# Patient Record
Sex: FEMALE | Race: WHITE
[De-identification: names, ages, dates, MRNs, and addresses within clinical notes are randomized per-mention and may not be internally consistent; named-entity substitution may affect disease eponyms.]

---

## 2020-12-28 ENCOUNTER — HOSPITAL ENCOUNTER (OUTPATIENT)
Age: 60
End: 2020-12-28
Payer: OTHER GOVERNMENT

## 2020-12-28 DIAGNOSIS — Z12.31: Primary | ICD-10-CM

## 2020-12-28 PROCEDURE — 77067 SCR MAMMO BI INCL CAD: CPT

## 2020-12-28 PROCEDURE — 77063 BREAST TOMOSYNTHESIS BI: CPT

## 2020-12-28 NOTE — DI.MG.S_ITS
BILATERAL DIGITAL SCREENING MAMMOGRAM 3D/2D WITH CAD: 12/28/2020  
   
CLINICAL: Routine screening.    
   
Comparison is made to exams dated:  11/20/2015 mammogram and 4/24/2017 mammogram -   
outside location.  There are scattered fibroglandular elements in both breasts.    
   
Current study was also evaluated with a Computer Aided Detection (CAD) system.    
No significant masses, calcifications, or other findings are seen in either breast.    
There has been no significant interval change.  
   
IMPRESSION: NEGATIVE  
There is no mammographic evidence of malignancy. A 1 year screening mammogram is   
recommended.    
   
   
This exam was interpreted at Station ID: [id number].    
   
NOTE: For mammograms, a report in lay terms will be sent to the patient. Approximately   
15% of breast malignancies will not be visualized mammographically. In the management of   
a palpable breast mass, a negative mammogram must not discourage biopsy of a clinically   
suspicious lesion.  
   
Electronically Signed By: Bryce [email or web address]:12/28/2020 17:23:36    
   
   
letter sent: Normal Exam    
ACR BI-RADS Category 1: Negative [id number]

## 2022-03-18 ENCOUNTER — HOSPITAL ENCOUNTER (OUTPATIENT)
Age: 62
End: 2022-03-18
Payer: OTHER GOVERNMENT

## 2022-03-18 DIAGNOSIS — Z12.31: Primary | ICD-10-CM

## 2022-03-18 PROCEDURE — 77063 BREAST TOMOSYNTHESIS BI: CPT

## 2022-03-18 PROCEDURE — 77067 SCR MAMMO BI INCL CAD: CPT

## 2022-04-14 ENCOUNTER — HOSPITAL ENCOUNTER (EMERGENCY)
Age: 62
Discharge: HOME | End: 2022-04-14
Payer: OTHER GOVERNMENT

## 2022-04-14 VITALS
HEART RATE: 75 BPM | TEMPERATURE: 97.8 F | OXYGEN SATURATION: 100 % | SYSTOLIC BLOOD PRESSURE: 125 MMHG | DIASTOLIC BLOOD PRESSURE: 83 MMHG | RESPIRATION RATE: 18 BRPM

## 2022-04-14 VITALS
RESPIRATION RATE: 16 BRPM | SYSTOLIC BLOOD PRESSURE: 118 MMHG | HEART RATE: 77 BPM | OXYGEN SATURATION: 100 % | DIASTOLIC BLOOD PRESSURE: 82 MMHG

## 2022-04-14 VITALS — BODY MASS INDEX: 24.7 KG/M2

## 2022-04-14 DIAGNOSIS — B96.4: ICD-10-CM

## 2022-04-14 DIAGNOSIS — R11.2: Primary | ICD-10-CM

## 2022-04-14 DIAGNOSIS — R19.7: ICD-10-CM

## 2022-04-14 LAB
ADD MANUAL DIFF / SLIDE REVIEW: NO
ALBUMIN SERPL-MCNC: 4.7 G/DL (ref 3.5–5)
ALBUMIN/GLOB SERPL: 1.3 {RATIO} (ref 1–2.8)
ALP SERPL-CCNC: 226 U/L (ref 38–126)
ALT SERPL-CCNC: 306 IU/L (ref ?–35)
APPEARANCE UR: (no result)
BILIRUBIN URINE UA: (no result)
BUN SERPL-MCNC: 15 MG/DL (ref 7–17)
CALCIUM SERPL-MCNC: 10.1 MG/DL (ref 8.4–10.2)
CHLORIDE SERPL-SCNC: 108 MMOL/L (ref 98–107)
CO2 SERPL-SCNC: 21 MMOL/L (ref 22–32)
COLOR UR: YELLOW
CULTURE INDICATED URINE: (no result)
ESTIMATED GLOMERULAR FILT RATE: > 60 ML/MIN (ref 60–?)
GLOBULIN SER CALC-MCNC: 3.7 G/DL (ref 1.7–4.1)
GLUCOSE SERPL-MCNC: 116 MG/DL (ref 70–100)
GLUCOSE URINE UA: NEGATIVE G/DL
HEMATOCRIT: 43.7 % (ref 36–46)
HEMOGLOBIN: 14.9 G/DL (ref 12–16)
HEMOLYSIS: 68 (ref 0–50)
HGB UR QL: (no result)
KETONES URINE UA: (no result)
LEUKOCYTE ESTERASE URINE UA: (no result)
LIPASE SERPL-CCNC: 101 U/L (ref 23–300)
LYMPHOCYTES # SPEC AUTO: 1300 /UL (ref 1100–4500)
MCV RBC: 89.7 FL (ref 80–100)
MEAN CORPUSCULAR HEMOGLOBIN: 30.5 PG (ref 26–34)
MEAN CORPUSCULAR HGB CONC: 34 % (ref 30–36)
NITRITE URINE UA: NEGATIVE
PH UR: 6 [PH] (ref 4.5–8)
PLATELET COUNT: 186 X10^3/UL (ref 150–400)
POTASSIUM SERPL-SCNC: 3.8 MMOL/L (ref 3.4–5.1)
PROT SERPL-MCNC: 8.4 G/DL (ref 6.3–8.2)
PROTEIN URINE UA: (no result)
SODIUM SERPL-SCNC: 139 MMOL/L (ref 137–145)
SP GR UR: 1.02 (ref 1–1.03)
UROBILINOGEN UR QL: 0.2 E.U./DL

## 2022-04-14 PROCEDURE — 87077 CULTURE AEROBIC IDENTIFY: CPT

## 2022-04-14 PROCEDURE — 81001 URINALYSIS AUTO W/SCOPE: CPT

## 2022-04-14 PROCEDURE — 93005 ELECTROCARDIOGRAM TRACING: CPT

## 2022-04-14 PROCEDURE — 87507 IADNA-DNA/RNA PROBE TQ 12-25: CPT

## 2022-04-14 PROCEDURE — 36415 COLL VENOUS BLD VENIPUNCTURE: CPT

## 2022-04-14 PROCEDURE — 74177 CT ABD & PELVIS W/CONTRAST: CPT

## 2022-04-14 PROCEDURE — 87186 SC STD MICRODIL/AGAR DIL: CPT

## 2022-04-14 PROCEDURE — 96361 HYDRATE IV INFUSION ADD-ON: CPT

## 2022-04-14 PROCEDURE — 83690 ASSAY OF LIPASE: CPT

## 2022-04-14 PROCEDURE — 87086 URINE CULTURE/COLONY COUNT: CPT

## 2022-04-14 PROCEDURE — 96360 HYDRATION IV INFUSION INIT: CPT

## 2022-04-14 PROCEDURE — 93010 ELECTROCARDIOGRAM REPORT: CPT

## 2022-04-14 PROCEDURE — 80053 COMPREHEN METABOLIC PANEL: CPT

## 2022-04-14 PROCEDURE — 85025 COMPLETE CBC W/AUTO DIFF WBC: CPT

## 2022-04-14 PROCEDURE — 99284 EMERGENCY DEPT VISIT MOD MDM: CPT

## 2023-10-13 ENCOUNTER — HOSPITAL ENCOUNTER (EMERGENCY)
Age: 63
Discharge: HOME | End: 2023-10-13
Payer: OTHER GOVERNMENT

## 2023-10-13 VITALS — OXYGEN SATURATION: 97 % | HEART RATE: 71 BPM

## 2023-10-13 VITALS — DIASTOLIC BLOOD PRESSURE: 89 MMHG | SYSTOLIC BLOOD PRESSURE: 174 MMHG | OXYGEN SATURATION: 100 % | HEART RATE: 75 BPM

## 2023-10-13 VITALS — OXYGEN SATURATION: 100 % | SYSTOLIC BLOOD PRESSURE: 145 MMHG | DIASTOLIC BLOOD PRESSURE: 87 MMHG | HEART RATE: 72 BPM

## 2023-10-13 VITALS — SYSTOLIC BLOOD PRESSURE: 126 MMHG | DIASTOLIC BLOOD PRESSURE: 80 MMHG | HEART RATE: 74 BPM | OXYGEN SATURATION: 99 %

## 2023-10-13 VITALS — OXYGEN SATURATION: 100 % | SYSTOLIC BLOOD PRESSURE: 155 MMHG | DIASTOLIC BLOOD PRESSURE: 94 MMHG | HEART RATE: 74 BPM

## 2023-10-13 VITALS
HEART RATE: 72 BPM | DIASTOLIC BLOOD PRESSURE: 87 MMHG | RESPIRATION RATE: 17 BRPM | SYSTOLIC BLOOD PRESSURE: 148 MMHG | OXYGEN SATURATION: 100 % | TEMPERATURE: 97.9 F

## 2023-10-13 VITALS — HEART RATE: 66 BPM | OXYGEN SATURATION: 100 %

## 2023-10-13 VITALS — SYSTOLIC BLOOD PRESSURE: 132 MMHG | DIASTOLIC BLOOD PRESSURE: 86 MMHG

## 2023-10-13 VITALS — BODY MASS INDEX: 24.3 KG/M2

## 2023-10-13 DIAGNOSIS — N39.0: ICD-10-CM

## 2023-10-13 DIAGNOSIS — N20.0: Primary | ICD-10-CM

## 2023-10-13 LAB
ADD MANUAL DIFF / SLIDE REVIEW: NO
ALBUMIN SERPL-MCNC: 4.6 G/DL (ref 3.5–5)
ALBUMIN/GLOB SERPL: 1.4 {RATIO} (ref 1–2.8)
ALP SERPL-CCNC: 116 U/L (ref 38–126)
ALT SERPL-CCNC: 33 IU/L (ref ?–35)
BUN SERPL-MCNC: 14 MG/DL (ref 7–17)
CALCIUM SERPL-MCNC: 10.4 MG/DL (ref 8.4–10.2)
CHLORIDE SERPL-SCNC: 106 MMOL/L (ref 98–107)
CO2 SERPL-SCNC: 28 MMOL/L (ref 22–32)
CULTURE INDICATED URINE: (no result)
ESTIMATED GLOMERULAR FILT RATE: > 60 ML/MIN (ref 60–?)
GLOBULIN SER CALC-MCNC: 3.3 G/DL (ref 1.7–4.1)
GLUCOSE SERPL-MCNC: 107 MG/DL (ref 80–110)
HEMATOCRIT: 40.7 % (ref 36–46)
HEMOGLOBIN: 13.8 G/DL (ref 12–16)
HEMOLYSIS: 19 (ref 0–50)
LIPASE SERPL-CCNC: 159 U/L (ref 23–300)
LYMPHOCYTES # SPEC AUTO: 1100 /UL (ref 1100–4500)
MCV RBC: 89.6 FL (ref 80–100)
MEAN CORPUSCULAR HEMOGLOBIN: 30.3 PG (ref 26–34)
MEAN CORPUSCULAR HGB CONC: 33.8 % (ref 30–36)
PLATELET COUNT: 208 X10^3/UL (ref 150–400)
POTASSIUM SERPL-SCNC: 4.6 MMOL/L (ref 3.4–5.1)
PROT SERPL-MCNC: 7.9 G/DL (ref 6.3–8.2)
SODIUM SERPL-SCNC: 137 MMOL/L (ref 137–145)

## 2023-10-13 PROCEDURE — 81015 MICROSCOPIC EXAM OF URINE: CPT

## 2023-10-13 PROCEDURE — 96375 TX/PRO/DX INJ NEW DRUG ADDON: CPT

## 2023-10-13 PROCEDURE — 87186 SC STD MICRODIL/AGAR DIL: CPT

## 2023-10-13 PROCEDURE — 93005 ELECTROCARDIOGRAM TRACING: CPT

## 2023-10-13 PROCEDURE — 81003 URINALYSIS AUTO W/O SCOPE: CPT

## 2023-10-13 PROCEDURE — 85025 COMPLETE CBC W/AUTO DIFF WBC: CPT

## 2023-10-13 PROCEDURE — 83690 ASSAY OF LIPASE: CPT

## 2023-10-13 PROCEDURE — 99284 EMERGENCY DEPT VISIT MOD MDM: CPT

## 2023-10-13 PROCEDURE — 36415 COLL VENOUS BLD VENIPUNCTURE: CPT

## 2023-10-13 PROCEDURE — 87086 URINE CULTURE/COLONY COUNT: CPT

## 2023-10-13 PROCEDURE — 74177 CT ABD & PELVIS W/CONTRAST: CPT

## 2023-10-13 PROCEDURE — 87077 CULTURE AEROBIC IDENTIFY: CPT

## 2023-10-13 PROCEDURE — 96365 THER/PROPH/DIAG IV INF INIT: CPT

## 2023-10-13 PROCEDURE — 80053 COMPREHEN METABOLIC PANEL: CPT

## 2025-05-04 ENCOUNTER — HOSPITAL ENCOUNTER (EMERGENCY)
Age: 65
Discharge: HOME | End: 2025-05-04
Payer: OTHER GOVERNMENT

## 2025-05-04 VITALS
OXYGEN SATURATION: 99 % | HEART RATE: 64 BPM | DIASTOLIC BLOOD PRESSURE: 86 MMHG | SYSTOLIC BLOOD PRESSURE: 133 MMHG | RESPIRATION RATE: 22 BRPM

## 2025-05-04 VITALS
RESPIRATION RATE: 16 BRPM | DIASTOLIC BLOOD PRESSURE: 82 MMHG | HEART RATE: 70 BPM | SYSTOLIC BLOOD PRESSURE: 139 MMHG | OXYGEN SATURATION: 99 %

## 2025-05-04 VITALS — OXYGEN SATURATION: 99 % | HEART RATE: 68 BPM

## 2025-05-04 VITALS
SYSTOLIC BLOOD PRESSURE: 119 MMHG | DIASTOLIC BLOOD PRESSURE: 83 MMHG | HEART RATE: 68 BPM | OXYGEN SATURATION: 99 % | RESPIRATION RATE: 16 BRPM

## 2025-05-04 VITALS
HEART RATE: 70 BPM | SYSTOLIC BLOOD PRESSURE: 137 MMHG | OXYGEN SATURATION: 99 % | RESPIRATION RATE: 20 BRPM | DIASTOLIC BLOOD PRESSURE: 85 MMHG

## 2025-05-04 VITALS
DIASTOLIC BLOOD PRESSURE: 94 MMHG | BODY MASS INDEX: 24.7 KG/M2 | RESPIRATION RATE: 17 BRPM | OXYGEN SATURATION: 100 % | HEART RATE: 75 BPM | TEMPERATURE: 98.2 F | SYSTOLIC BLOOD PRESSURE: 153 MMHG

## 2025-05-04 DIAGNOSIS — R51.9: Primary | ICD-10-CM

## 2025-05-04 DIAGNOSIS — I10: ICD-10-CM
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
## 2025-05-04 DIAGNOSIS — R42: ICD-10-CM

## 2025-05-04 LAB
ADD MANUAL DIFF / SLIDE REVIEW: NO
ALBUMIN SERPL-MCNC: 4.2 G/DL (ref 3.5–5)
ALBUMIN/GLOB SERPL: 1.4 {RATIO} (ref 1–2.8)
ALP SERPL-CCNC: 94 U/L (ref 38–126)
ALT SERPL-CCNC: 26 IU/L (ref ?–35)
BUN SERPL-MCNC: 10 MG/DL (ref 7–17)
CALCIUM SERPL-MCNC: 10.1 MG/DL (ref 8.4–10.2)
CHLORIDE SERPL-SCNC: 107 MMOL/L (ref 98–107)
CK SERPL-CCNC: 71 U/L (ref 30–135)
CO2 SERPL-SCNC: 26 MMOL/L (ref 22–32)
ESTIMATED GLOMERULAR FILT RATE: > 60 ML/MIN (ref 60–?)
GLOBULIN SER CALC-MCNC: 3.1 G/DL (ref 1.7–4.1)
GLUCOSE SERPL-MCNC: 114 MG/DL (ref 70–99)
HEMATOCRIT: 37.9 % (ref 36–46)
HEMOGLOBIN: 12.9 G/DL (ref 12–16)
HEMOLYSIS: < 15 (ref 0–50)
LIPASE SERPL-CCNC: 105 U/L (ref 23–300)
LYMPHOCYTES # SPEC AUTO: 1700 /UL (ref 1100–4500)
MCH RBC QN AUTO: 29.8 PG (ref 26–34)
MCV RBC: 87.5 FL (ref 80–100)
MEAN CORPUSCULAR HGB CONC: 34.1 % (ref 30–36)
PLATELET COUNT: 208 X10^3/UL (ref 150–400)
POTASSIUM SERPL-SCNC: 3.8 MMOL/L (ref 3.4–5.1)
PROT SERPL-MCNC: 7.3 G/DL (ref 6.3–8.2)
SODIUM SERPL-SCNC: 139 MMOL/L (ref 137–145)
TROPONIN I SERPL-MCNC: < 0.012 NG/ML (ref 0.01–0.03)

## 2025-05-04 PROCEDURE — 99284 EMERGENCY DEPT VISIT MOD MDM: CPT

## 2025-05-04 PROCEDURE — 93005 ELECTROCARDIOGRAM TRACING: CPT

## 2025-05-04 PROCEDURE — 80053 COMPREHEN METABOLIC PANEL: CPT

## 2025-05-04 PROCEDURE — 96360 HYDRATION IV INFUSION INIT: CPT

## 2025-05-04 PROCEDURE — 84484 ASSAY OF TROPONIN QUANT: CPT

## 2025-05-04 PROCEDURE — 70450 CT HEAD/BRAIN W/O DYE: CPT

## 2025-05-04 PROCEDURE — 93010 ELECTROCARDIOGRAM REPORT: CPT

## 2025-05-04 PROCEDURE — 36415 COLL VENOUS BLD VENIPUNCTURE: CPT

## 2025-05-04 PROCEDURE — 82550 ASSAY OF CK (CPK): CPT

## 2025-05-04 PROCEDURE — 85025 COMPLETE CBC W/AUTO DIFF WBC: CPT

## 2025-05-04 PROCEDURE — 83690 ASSAY OF LIPASE: CPT
